# Patient Record
Sex: FEMALE
[De-identification: names, ages, dates, MRNs, and addresses within clinical notes are randomized per-mention and may not be internally consistent; named-entity substitution may affect disease eponyms.]

---

## 2023-08-31 ENCOUNTER — APPOINTMENT (OUTPATIENT)
Dept: UROLOGY | Facility: CLINIC | Age: 63
End: 2023-08-31
Payer: COMMERCIAL

## 2023-08-31 VITALS
HEART RATE: 85 BPM | OXYGEN SATURATION: 96 % | SYSTOLIC BLOOD PRESSURE: 117 MMHG | DIASTOLIC BLOOD PRESSURE: 75 MMHG | HEIGHT: 64 IN | TEMPERATURE: 97 F | WEIGHT: 136 LBS | BODY MASS INDEX: 23.22 KG/M2

## 2023-08-31 DIAGNOSIS — Z80.1 FAMILY HISTORY OF MALIGNANT NEOPLASM OF TRACHEA, BRONCHUS AND LUNG: ICD-10-CM

## 2023-08-31 DIAGNOSIS — F17.200 NICOTINE DEPENDENCE, UNSPECIFIED, UNCOMPLICATED: ICD-10-CM

## 2023-08-31 DIAGNOSIS — R31.29 OTHER MICROSCOPIC HEMATURIA: ICD-10-CM

## 2023-08-31 DIAGNOSIS — Z80.9 FAMILY HISTORY OF MALIGNANT NEOPLASM, UNSPECIFIED: ICD-10-CM

## 2023-08-31 DIAGNOSIS — Z78.9 OTHER SPECIFIED HEALTH STATUS: ICD-10-CM

## 2023-08-31 DIAGNOSIS — Z86.39 PERSONAL HISTORY OF OTHER ENDOCRINE, NUTRITIONAL AND METABOLIC DISEASE: ICD-10-CM

## 2023-08-31 DIAGNOSIS — Z80.3 FAMILY HISTORY OF MALIGNANT NEOPLASM OF BREAST: ICD-10-CM

## 2023-08-31 DIAGNOSIS — R10.9 UNSPECIFIED ABDOMINAL PAIN: ICD-10-CM

## 2023-08-31 PROBLEM — Z00.00 ENCOUNTER FOR PREVENTIVE HEALTH EXAMINATION: Status: ACTIVE | Noted: 2023-08-31

## 2023-08-31 LAB
BILIRUB UR QL STRIP: NORMAL
CLARITY UR: CLEAR
COLLECTION METHOD: NORMAL
GLUCOSE UR-MCNC: NORMAL
HCG UR QL: 0.2 EU/DL
HGB UR QL STRIP.AUTO: NORMAL
KETONES UR-MCNC: NORMAL
LEUKOCYTE ESTERASE UR QL STRIP: NORMAL
NITRITE UR QL STRIP: NORMAL
PH UR STRIP: 6
PROT UR STRIP-MCNC: NORMAL
SP GR UR STRIP: 1

## 2023-08-31 PROCEDURE — 81003 URINALYSIS AUTO W/O SCOPE: CPT | Mod: QW

## 2023-08-31 PROCEDURE — 99204 OFFICE O/P NEW MOD 45 MIN: CPT

## 2023-08-31 RX ORDER — PRAVASTATIN SODIUM 20 MG/1
20 TABLET ORAL
Refills: 0 | Status: ACTIVE | COMMUNITY

## 2023-09-01 LAB
ANION GAP SERPL CALC-SCNC: 13 MMOL/L
APPEARANCE: CLEAR
BACTERIA: NEGATIVE /HPF
BILIRUBIN URINE: NEGATIVE
BLOOD URINE: NEGATIVE
BUN SERPL-MCNC: 11 MG/DL
CALCIUM SERPL-MCNC: 10.5 MG/DL
CAST: 0 /LPF
CHLORIDE SERPL-SCNC: 104 MMOL/L
CO2 SERPL-SCNC: 27 MMOL/L
COLOR: YELLOW
CREAT SERPL-MCNC: 0.79 MG/DL
EGFR: 85 ML/MIN/1.73M2
EPITHELIAL CELLS: 0 /HPF
GLUCOSE QUALITATIVE U: NEGATIVE MG/DL
GLUCOSE SERPL-MCNC: 93 MG/DL
KETONES URINE: NEGATIVE MG/DL
LEUKOCYTE ESTERASE URINE: NEGATIVE
MICROSCOPIC-UA: NORMAL
NITRITE URINE: NEGATIVE
PH URINE: 7
POTASSIUM SERPL-SCNC: 4.8 MMOL/L
PROTEIN URINE: NEGATIVE MG/DL
RED BLOOD CELLS URINE: 1 /HPF
SODIUM SERPL-SCNC: 144 MMOL/L
SPECIFIC GRAVITY URINE: <1.005
UROBILINOGEN URINE: 0.2 MG/DL
WHITE BLOOD CELLS URINE: 0 /HPF

## 2023-09-05 NOTE — HISTORY OF PRESENT ILLNESS
[FreeTextEntry1] : Language: English Date of First visit: 2023 Accompanied by: Self Contact info: *** Referring Provider/PCP: Lisa Henry fax: [phone]  Dr. Cornejo Fax: [phone]       CC/ Problem List:   =============================================================================== FIRST VISIT: The patient is a 63 year female who first presents 2023 for hematuria.  She has been getting right sided abd pain. She gets a sharp pain that radiates from her umbilicus to her flank. She is always told this is a UTI but she has no dysuria, urgency and frequency. She finally had a Urine culture and it was negative. The pain is always there in a mild way, but at night when she was lying down she will get a serious flare that will leave her "twisted". She has not seen a GI doctor. She does have nausea when she eats.   She has never had gross hematuria, dysuria. She smoked in the past, quit . She has no h/o chemo, RT, TB exposure, chemical exposure, schisto exposure, narcotic abuse,  trauma or surgery. She has no FH of kidney issues.      ------------------------------------------------------------------------------------------- INTERVAL VISITS:     ===============================================================================   PMH: HLD PSH: Laparoscopy for abnormal PAP POBH: (if applicable)  FH:   ALL: Mobic / Latex cause rash MEDS: Pravastatin, no vitamins/supps SOC: Quit Tob , No EtOH, no Drugs     ROS: Review of Systems is as per HPI unless otherwise denoted below     =============================================================================== DATA:   LABS:-------------------------------------------------------------------------------------------------------------------  2023: UA dip small blood, NEg NIT/LE     RADS:-------------------------------------------------------------------------------------------------------------------       PATHOLOGY/CYTOLOGY:-------------------------------------------------------------------------------------------       VOIDING STUDIES: ----------------------------------------------------------------------------------------------------       STONE STUDIES: (Analysis/LLSA)----------------------------------------------------------------------------------       PROCEDURES: -----------------------------------------------------------------------------------------------         ===============================================================================   PHYSICAL EXAM:  GEN: AAOx3, NAD, Habitus: normal  BARRIERS to CARE: none  PSYCH: Appropriate Behavior, Affect Congruent  HEENT: AT/NC Trachea midline.   Lungs: No labored breathing.  NEURO: + Movement, all 4 extremities grossly intact without deficits. No tremors.  SKIN: Warm dry. No visible rashes or ulcers  GAIT: Gait normal, Stability good   =======================================================================================      ASSESSMENT and PLAN   The patient is a 63 year female with a history of the followin. Microhematuria with a h/o smoking ************************************** MICROHEMATURIA GUIDELINES**************************************************  Hematuria RISK FACTORS----------------------------------------------------------------------------------------------------- Age, Gender, Smoking, Chronic foreign body/catheter, occupational exposure,  Prior Cyclophosphamide or ifosfomide, prior RT, irritative voiding symptoms  RISK STRATIFICATION---------------------------------------------------------------------------------------------------------  -.-.-.-.-.-.-.-.-.-.-.Low Risk-.-.-.-.-.-.-.-.-.-.-.-.-.Mod Risk-.-.-.-.-.-.-.-.-.-.-.-.-.-.High Risk-.-.-.-.-.-.-.-.-.-.-.-.-.-.-.-.-.-.- (ALL OF THE BELOW)                    (ANY ONE OF THE BELOW)         (ANY OF THE BELOW or MORE) --Females <51yo                                   --Female 50-58yo                      --Females >59yo -- Males <39yo                                      --Males 40-58yo                        --Males >59yo 0-10 pack years                                   10-30 pack years                       >30 pack years 3-10 RBC/hpf                                         11-25 RBC/hpf                          >25 RBC/hpf No RISK FACTORS                               ANY RISK FACTORS                                                                                Low risk w/ 3-10 RBC                 Gross hematuria                                                                     on repeat UA   WORKUP------------------------------------------------------------------------------------------------------------------------- -.-.-.-.-.-.-.-.-.-.-.Low Risk-.-.-.-.-.-.-.-.-.-.-.-.-.Mod Risk-.-.-.-.-.-.-.-.-.-.-.-.-.-.High Risk-.-.-.-.-.-.-.-.-.-.-.-.-.-.-.-.-.-.- --Initial: Repeat UA 6 mos                           Cystoscopy                              Cystoscopy             OR                                                        AND                                         AND    Cysto, Renal sono                                   Renal Sono                                    CTU OR                                                                                                               2nd Choice: MRU                                                                                                               3rd Choice: RTGP w/ NCCT or Sono  **In patients with microhematuria with a known FH of RCC or genetic renal tumor syndrome,  upper tract imaging should be performed regardless of risk stratification   2. Severe abdominal pain at night As a part of her hematuria workup we will be getting a CTAP which would r/o any significant visceral pathology. She agreed to this.   ----------------------------------------------------------------------------------------------------- LABS/TESTS Ordered: BMP, UA micro, CTU  Meds Ordered: Follow up: Cystoscopy -----------------------------------------------------------------------------------------------------   The total amount of time I have personally spent preparing for this visit, reviewing the patient's test results, obtaining external history, ordering tests/medications, documenting clinical information, communicating with and counseling the patient/family and/or caregiver(s), and spent face to face with the patient explaining the above was 45 minutes.  Thank you for allowing me to assist in the care of your patient. Should you have any questions please do not hesitate to reach out to me.     Tita Cabrera MD  Department of Urology [facility] Phone: [phone] Fax: [phone] [address] [address]

## 2023-09-05 NOTE — ADDENDUM
[FreeTextEntry1] : 8/31/2023: sCre 0.79, UA SG <1.005, Neg PRT, GLC, BLD, NIT, LE; 0 WBC, 1 RBC, 0 Cast, Neg Bacteria

## 2023-09-19 ENCOUNTER — APPOINTMENT (OUTPATIENT)
Dept: UROLOGY | Facility: CLINIC | Age: 63
End: 2023-09-19